# Patient Record
Sex: MALE | Race: BLACK OR AFRICAN AMERICAN | NOT HISPANIC OR LATINO
[De-identification: names, ages, dates, MRNs, and addresses within clinical notes are randomized per-mention and may not be internally consistent; named-entity substitution may affect disease eponyms.]

---

## 2018-02-09 PROBLEM — Z00.00 ENCOUNTER FOR PREVENTIVE HEALTH EXAMINATION: Status: ACTIVE | Noted: 2018-02-09

## 2018-02-21 ENCOUNTER — FORM ENCOUNTER (OUTPATIENT)
Age: 63
End: 2018-02-21

## 2018-02-22 ENCOUNTER — APPOINTMENT (OUTPATIENT)
Dept: ORTHOPEDIC SURGERY | Facility: CLINIC | Age: 63
End: 2018-02-22
Payer: COMMERCIAL

## 2018-02-22 ENCOUNTER — OUTPATIENT (OUTPATIENT)
Dept: OUTPATIENT SERVICES | Facility: HOSPITAL | Age: 63
LOS: 1 days | End: 2018-02-22
Payer: COMMERCIAL

## 2018-02-22 VITALS
WEIGHT: 250 LBS | BODY MASS INDEX: 30.44 KG/M2 | HEIGHT: 76 IN | DIASTOLIC BLOOD PRESSURE: 80 MMHG | SYSTOLIC BLOOD PRESSURE: 140 MMHG

## 2018-02-22 DIAGNOSIS — Z87.891 PERSONAL HISTORY OF NICOTINE DEPENDENCE: ICD-10-CM

## 2018-02-22 DIAGNOSIS — Z86.39 PERSONAL HISTORY OF OTHER ENDOCRINE, NUTRITIONAL AND METABOLIC DISEASE: ICD-10-CM

## 2018-02-22 DIAGNOSIS — Z82.49 FAMILY HISTORY OF ISCHEMIC HEART DISEASE AND OTHER DISEASES OF THE CIRCULATORY SYSTEM: ICD-10-CM

## 2018-02-22 DIAGNOSIS — Z86.69 PERSONAL HISTORY OF OTHER DISEASES OF THE NERVOUS SYSTEM AND SENSE ORGANS: ICD-10-CM

## 2018-02-22 DIAGNOSIS — Z86.79 PERSONAL HISTORY OF OTHER DISEASES OF THE CIRCULATORY SYSTEM: ICD-10-CM

## 2018-02-22 DIAGNOSIS — Z85.038 PERSONAL HISTORY OF OTHER MALIGNANT NEOPLASM OF LARGE INTESTINE: ICD-10-CM

## 2018-02-22 PROCEDURE — 99203 OFFICE O/P NEW LOW 30 MIN: CPT

## 2018-02-22 PROCEDURE — 72170 X-RAY EXAM OF PELVIS: CPT | Mod: 26

## 2018-02-22 PROCEDURE — 72170 X-RAY EXAM OF PELVIS: CPT

## 2018-02-22 PROCEDURE — 73564 X-RAY EXAM KNEE 4 OR MORE: CPT

## 2018-02-22 PROCEDURE — 73564 X-RAY EXAM KNEE 4 OR MORE: CPT | Mod: 26,50

## 2018-02-22 RX ORDER — COLCHICINE 0.6 MG/1
0.6 TABLET ORAL
Refills: 0 | Status: ACTIVE | COMMUNITY

## 2018-02-22 RX ORDER — LISINOPRIL 30 MG/1
TABLET ORAL
Refills: 0 | Status: ACTIVE | COMMUNITY

## 2018-02-22 RX ORDER — INDOMETHACIN 50 MG/1
CAPSULE ORAL
Refills: 0 | Status: ACTIVE | COMMUNITY

## 2018-02-22 RX ORDER — LABETALOL HYDROCHLORIDE 300 MG/1
TABLET, FILM COATED ORAL
Refills: 0 | Status: ACTIVE | COMMUNITY

## 2018-02-22 RX ORDER — FEXOFENADINE HYDROCHLORIDE 180 MG/1
TABLET ORAL
Refills: 0 | Status: ACTIVE | COMMUNITY

## 2018-02-22 RX ORDER — FLUTICASONE PROPIONATE 50 MCG
SPRAY, SUSPENSION NASAL
Refills: 0 | Status: ACTIVE | COMMUNITY

## 2018-02-22 RX ORDER — METFORMIN HYDROCHLORIDE 625 MG/1
TABLET ORAL
Refills: 0 | Status: ACTIVE | COMMUNITY

## 2019-11-05 ENCOUNTER — APPOINTMENT (OUTPATIENT)
Dept: ORTHOPEDIC SURGERY | Facility: CLINIC | Age: 64
End: 2019-11-05
Payer: COMMERCIAL

## 2019-11-05 VITALS — WEIGHT: 245 LBS | BODY MASS INDEX: 29.83 KG/M2 | HEIGHT: 76 IN

## 2019-11-05 DIAGNOSIS — M17.12 UNILATERAL PRIMARY OSTEOARTHRITIS, LEFT KNEE: ICD-10-CM

## 2019-11-05 DIAGNOSIS — M17.11 UNILATERAL PRIMARY OSTEOARTHRITIS, RIGHT KNEE: ICD-10-CM

## 2019-11-05 PROCEDURE — 99214 OFFICE O/P EST MOD 30 MIN: CPT

## 2019-11-05 NOTE — DISCUSSION/SUMMARY
[de-identified] : Mr. GRIFFITH has progressively severe knee pain and disability secondary to DJD and has failed extensive conservative care including activity modification, analgesic medications and therapeutic exercise. The patient was indicated for two stage bilateral total knee replacement, left before right.\par \par We discussed the details of the procedure, the expected recovery period, and the expected outcome. We discussed the likelihood of satisfaction after complete recovery, and the potential causes of dissatisfaction. The importance of active patient participation in the rehabilitation protocol was emphasized, along with its influence on short and long-term outcomes. Specific risks of total knee replacement were discussed in detail. We discussed the risk of surgical site complications including but not limited to: surgical site infection, wound healing complications, bone fracture, tendon or ligament injury, neurovascular injury, hemorrhage, postoperative stiffness or instability, persistent pain and need for reoperation or manipulation under anesthesia. We discussed surgical blood loss and the possible need for blood transfusion. We discussed the risk of perioperative medical complications, including but not limited to catheter-associated urinary tract infection, venous thromboembolism and other cardiopulmonary complications. We discussed anesthetic options and the risk of anesthesia-related complications. We discussed the durability of prosthetic knees and limitations related to wear, osteolysis and loosening. The patient was given a copy of my preoperative packet with additional information about the procedure.\par \par I emphasized the importance of doing the proper physical therapy in the time between the first and second operations so that his arthritic right knee will not limit the operative recovery of his left knee. We also discussed the types of anesthesia used in the operation and the ways in which we work to make them as safe as possible.\par \par The patient gave informed consent for the surgical procedure and was instructed to speak to my surgical coordinator to arrange the logistics of surgical scheduling, presurgical testing, and medical optimization and clearance.

## 2019-11-05 NOTE — HISTORY OF PRESENT ILLNESS
[de-identified] : 63 y/o M presents today for follow up evaluation and surgical consult of bilateral knee pain, left worse than right. He was last seen in February of 2018. Patient reports that since then he's lost weight and has gotten his DM under control. Today he reports severe left knee pain and moderate, continual right knee pain. He reports that both knees are moderately stiff and unstable on stairs. Patient can walk greater than 10 blocks with a moderate limp, bilateral knee braces, and a cane for long distances. He uses a rail to ascend and descend stairs. \par Patient has a h/o colon cancer that was treated with surgery. He states that he is nervous about anesthesia during the operation because after a previous operation he fell asleep on the button controlling his medication, overdosed, and needed Narcan to recover.\par Of note, patient works in  and reports that his work is very physical.

## 2019-11-05 NOTE — PHYSICAL EXAM
[de-identified] : Well appearing. NAD. Alert and oriented x 3. No respiratory distress.\par Bilateral upper extremities: Skin intact. No deformity. Painless active ROM without evident restriction.\par Cervical, thoracic and lumbar spine: No visible deformity. Painless active ROM without evident restriction. No radicular pain on passive straight leg raise bilaterally.\par \par Gait: Antalgic.\par Ambulatory assist devices: Cane. Bilateral knee braces.\par \par Bilateral Hips: No swelling or deformity. Painless and unrestricted range of motion. No crepitation. \par \par Right Knee:\par Skin intact. No surgical scars. No erythema or ecchymosis. No swelling or effusion. No deformity. (+) Lateral joint line tenderness.\par Painless ROM from a 10 degree flexion contracture to 105 degrees of flexion.\par Central patellar tracking. (+) crepitation. No instability.\par \par Left Knee:\par Skin intact. No surgical scars. No erythema or ecchymosis. No swelling or effusion. (+) Valgus deformity. (+) Lateral joint line tenderness. (+) Mild pain with patellar grind.\par Painful ROM from a 5 degree flexion contracture to 110 degrees of flexion.\par Central patellar tracking. (+) Significant crepitation. No instability.\par \par Neurological: Intact distal crude touch sensation. Normal distal motor power. \par Cardiovascular: Lower extremities warm and well perfused. No peripheral edema. [de-identified] : No new imaging was reviewed at this time.\par

## 2019-11-05 NOTE — END OF VISIT
[FreeTextEntry3] : All medical record entries made by Abad Odom acting as a scribe for the performing provider (Terrence Mac MD and/or RUPERT Martinez) on 11/05/2019. All entries were dictated to me by the performing medical provider. In signing this record, the medical provider affirms that they have personally performed the history, physical exam, assessment and plan and have also directed, reviewed and agreed to the documentation in the chart.

## 2020-06-18 ENCOUNTER — APPOINTMENT (OUTPATIENT)
Dept: PULMONOLOGY | Facility: CLINIC | Age: 65
End: 2020-06-18

## 2020-07-14 ENCOUNTER — APPOINTMENT (OUTPATIENT)
Dept: PULMONOLOGY | Facility: CLINIC | Age: 65
End: 2020-07-14
Payer: COMMERCIAL

## 2020-07-14 VITALS
HEIGHT: 76 IN | WEIGHT: 235 LBS | SYSTOLIC BLOOD PRESSURE: 170 MMHG | BODY MASS INDEX: 28.62 KG/M2 | HEART RATE: 66 BPM | DIASTOLIC BLOOD PRESSURE: 91 MMHG | OXYGEN SATURATION: 95 % | TEMPERATURE: 99 F

## 2020-07-14 PROCEDURE — 99203 OFFICE O/P NEW LOW 30 MIN: CPT

## 2020-07-15 NOTE — ASSESSMENT
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 Referred by Dr. Melo Reynolds for SAMIRA evaluation.\par \par 1) SAMIRA - He likely still has SAMIRA given his sleep-related symptoms. Will do a HST to further evaluate. He was referred to the [facility] for a diagnostic HST. Counselled about weight loss as well. The ramifications of SAMIRA and its treatment were discussed in detail. Follow up after the HST.

## 2020-07-15 NOTE — REVIEW OF SYSTEMS
[EDS: ESS=____] : daytime somnolence: ESS=[unfilled] [Arthralgias] : arthralgias [Lower Extremity Discomfort] : no lower extremity discomfort [Late day/ Evening symptoms] : no late day/evening symptoms [Cataplexy] :  no cataplexy [Unusual Sleep Behavior] : no unusual sleep behavior [Hypersomnolence] : not sleeping much more than usual [Negative] : Psychiatric

## 2020-07-15 NOTE — CONSULT LETTER
[Dear  ___] : Dear  [unfilled], [Please see my note below.] : Please see my note below. [Consult Letter:] : I had the pleasure of evaluating your patient, [unfilled]. [Consult Closing:] : Thank you very much for allowing me to participate in the care of this patient.  If you have any questions, please do not hesitate to contact me. [Sincerely,] : Sincerely, [FreeTextEntry3] : Brooke Cortez MD\par \par [place] [facility] [employer] [facility]\par Pulmonary, Critical Care, and Sleep Medicine\par

## 2020-07-15 NOTE — PHYSICAL EXAM
[Normal Appearance] : normal appearance [General Appearance - Well Developed] : well developed [No Deformities] : no deformities [Well Groomed] : well groomed [General Appearance - Well Nourished] : well nourished [General Appearance - In No Acute Distress] : no acute distress [Eyelids - No Xanthelasma] : the eyelids demonstrated no xanthelasmas [Normal Conjunctiva] : the conjunctiva exhibited no abnormalities [Normal Oropharynx] : normal oropharynx [Neck Appearance] : the appearance of the neck was normal [Neck Cervical Mass (___cm)] : no neck mass was observed [Jugular Venous Distention Increased] : there was no jugular-venous distention [Thyroid Diffuse Enlargement] : the thyroid was not enlarged [Thyroid Nodule] : there were no palpable thyroid nodules [Heart Rate And Rhythm] : heart rate was normal and rhythm regular [Heart Sounds] : normal S1 and S2 [Heart Sounds Gallop] : no gallops [Murmurs] : no murmurs [Heart Sounds Pericardial Friction Rub] : no pericardial rub [Auscultation Breath Sounds / Voice Sounds] : lungs were clear to auscultation bilaterally [Nail Clubbing] : no clubbing of the fingernails [Cyanosis, Localized] : no localized cyanosis [Petechial Hemorrhages (___cm)] : no petechial hemorrhages [Skin Turgor] : normal skin turgor [Skin Color & Pigmentation] : normal skin color and pigmentation [] : no rash [Oriented To Time, Place, And Person] : oriented to person, place, and time [Impaired Insight] : insight and judgment were intact [Affect] : the affect was normal [No Focal Deficits] : no focal deficits [FreeTextEntry1] : uses cane

## 2020-07-15 NOTE — HISTORY OF PRESENT ILLNESS
[Obstructive Sleep Apnea] : obstructive sleep apnea [Snoring] : snoring [Witnessed Apneas] : witnessed sleep apnea [Frequent Nocturnal Awakening] : frequent nocturnal awakening [Daytime Somnolence] : daytime somnolence [ESS: ___] : ESS score [unfilled] [Unintentional Sleep while Active] : unintentional sleep while active [Unintentional Sleep While Inactive] : unintentional sleep while inactive [Awakes Unrefreshed] : awakening unrefreshed [FreeTextEntry1] : 64 yo M with prior history of SAMIRA diagnosed 10-15 years ago, presents to us for re-evaluation. He had a sleep study done at that time at [facility] and received a CPAP machined then. He was compliant with the machine up until about 7 years ago, but misplaced it during a move, and has not been using it since then. He typically has frequent night time awakenings, snores very loudly, and does not feel refreshed after sleep. He has daytime sleepiness as well, and all of these symptoms have become gradually worse over the past couple of years, which prompted him to visit us today.\par \par ROS is negative aside from sleep symptoms and chronic knee pain.  [Awakes with Headache] : no headache upon awakening [Recent  Weight Gain] : no recent weight gain [Awakening With Dry Mouth] : no dry mouth upon awakening [Hypersomnolence] : no hypersomnolence [Unusual Sleep Behavior] : no unusual sleep behavior [Cataplexy] : no cataplexy [Sleep Paralysis] : no sleep paralysis [Hypnagogic Hallucinations] : no hallucinations when falling asleep [Lower Extremity Discomfort] : no lower extremity discomfort in evening or at bedtime [Hypnopompic Hallucinations] : no hallucinations when awakening

## 2022-06-02 ENCOUNTER — APPOINTMENT (OUTPATIENT)
Dept: ORTHOPEDIC SURGERY | Facility: CLINIC | Age: 67
End: 2022-06-02

## 2022-06-02 DIAGNOSIS — M75.102 UNSPECIFIED ROTATOR CUFF TEAR OR RUPTURE OF LEFT SHOULDER, NOT SPECIFIED AS TRAUMATIC: ICD-10-CM

## 2022-06-02 DIAGNOSIS — M12.812 OTHER SPECIFIC ARTHROPATHIES, NOT ELSEWHERE CLASSIFIED, LEFT SHOULDER: ICD-10-CM

## 2022-06-02 PROCEDURE — 73030 X-RAY EXAM OF SHOULDER: CPT | Mod: LT

## 2022-06-02 PROCEDURE — 76882 US LMTD JT/FCL EVL NVASC XTR: CPT | Mod: 59,LT

## 2022-06-02 PROCEDURE — 20611 DRAIN/INJ JOINT/BURSA W/US: CPT | Mod: LT

## 2022-06-02 PROCEDURE — 99204 OFFICE O/P NEW MOD 45 MIN: CPT | Mod: 25

## 2022-06-02 NOTE — HISTORY OF PRESENT ILLNESS
[de-identified] : LEFT SHOULDER PAIN \par PAIN STARTED MARCH 1, 2022- MIGHT BE A FLARE UP FROM AN OLD INJURY FROM YEARS AGO 2018\par CONSTANT \par RADIATES DOWN ARM \par PAIN LEVEL: 6/10\par SHARP, ACHY \par LIMITED ROM \par BETTER WITH REST \par WORSE WHEN OVER HEAD LIFTING, REACHING BEHIND, AT NIGHT \par PT SAW DR. ADEEL FATIMA- APRIL 29, 2022- DIAGNOSED WITH FROZEN SHOULDER \par PT IS SCHEDULED TO HAVE KNEE SURGERY IN JULY

## 2022-06-02 NOTE — PROCEDURE
[de-identified] : DIAGNOSTIC ULTRASOUND LEFT SHOULDER\par \par DIAGNOSTIC SONOGRAPHY of the Rotator Cuff Soft Tissue of the LEFT  SHOULDER was performed in Multiple Scan Planes with varying transducer frequencies.\par Imaging of the Supraspinatus Tendon reveals COMPLETE RETRACTED TEAR\par Imaging of the Biceps Tendon reveals no significant tear.\par Imaging of the Subscapularis Tendon reveals no significant tear.\par Imaging of the Infraspinatus Tendon reveals COMPLETE TEAR \par Key images were save digitally and reviewed with patient.\par

## 2022-06-02 NOTE — DISCUSSION/SUMMARY
[de-identified] : \par PATIENT HAS ELECTED TO PROCEED WITH KENALOG INJECTION SHOULDER \par RISKS AND BENEFITS DISCUSSED - VERBAL CONSENT OBTAINED \par SEE PROCEDURE NOTE\par \par \par POST INJECTION INSTRUCTIONS:\par \par INJECTION THERAPY HANDOUT PROVIDED\par \par COLD THERAPY , ANALGESICS PRN\par \par HOME  EXERCISES QD - PENDULUM AND ROM  HANDOUT PROVIDED, REVIEWED AND DEMONSTRATED - REFERRED TO INSTRUCTIONAL VIDEO ON MY WEBSITE\par \par START P.T.  WITHIN 2 WEEKS AFTER INJECTION - 2 X 4 WEEKS \par \par MRI IF NO RELIEF 12 WEEKS\par

## 2022-06-02 NOTE — PHYSICAL EXAM
[de-identified] : PHYSICAL EXAM LEFT  SHOULDER\par \par MARKED  SCAPULAR PROTRACTION\par AROM 135 / 135 / 55 / 0 \par TENDER: SA REGION ANTERIOR AND LATERAL\par \par SPECIAL TESTING :\par JOSE - POSITIVE \par YASSINE - POSITIVE \par SPEED TEST - POSITIVE\par \par MOMIN - NEGATIVE \par APPREHENSION AND SUPPRESSION - NEGATIVE \par \par RC STRENGTH TESTING \par SS:  5/5\par SUB 5/5\par IS     5/5\par BICEPS  5/5\par \par SENSATION  - GROSSLY INTACT\par \par \par  [de-identified] : LEFT SHOULDER XRAY (2 VIEWS - AP AND OUTLET) -  \par NO OBVIOUS FRACTURE , SEPARATION OR DISLOCATION \par SA SPACE 4MM\par GRADE 1  OSTEOARTHRITIS, \par TYPE 3B ACROMION + LATERAL SPUR\par CSA=39.1\par

## 2022-06-21 ENCOUNTER — NON-APPOINTMENT (OUTPATIENT)
Age: 67
End: 2022-06-21

## 2022-06-21 ENCOUNTER — OUTPATIENT (OUTPATIENT)
Dept: OUTPATIENT SERVICES | Facility: HOSPITAL | Age: 67
LOS: 1 days | End: 2022-06-21
Payer: COMMERCIAL

## 2022-06-21 ENCOUNTER — APPOINTMENT (OUTPATIENT)
Dept: PULMONOLOGY | Facility: CLINIC | Age: 67
End: 2022-06-21
Payer: COMMERCIAL

## 2022-06-21 VITALS
BODY MASS INDEX: 29.71 KG/M2 | HEIGHT: 76 IN | SYSTOLIC BLOOD PRESSURE: 163 MMHG | RESPIRATION RATE: 12 BRPM | HEART RATE: 78 BPM | WEIGHT: 244 LBS | OXYGEN SATURATION: 98 % | TEMPERATURE: 97.3 F | DIASTOLIC BLOOD PRESSURE: 97 MMHG

## 2022-06-21 PROCEDURE — 99214 OFFICE O/P EST MOD 30 MIN: CPT

## 2022-06-21 PROCEDURE — 71046 X-RAY EXAM CHEST 2 VIEWS: CPT

## 2022-06-21 PROCEDURE — 71046 X-RAY EXAM CHEST 2 VIEWS: CPT | Mod: 26

## 2022-06-21 NOTE — PHYSICAL EXAM
[No Acute Distress] : no acute distress [Normal Oropharynx] : normal oropharynx [Normal Appearance] : normal appearance [Normal Rate/Rhythm] : normal rate/rhythm [Normal S1, S2] : normal s1, s2 [No Resp Distress] : no resp distress [Clear to Auscultation Bilaterally] : clear to auscultation bilaterally [Normal Gait] : normal gait [No Clubbing] : no clubbing [Normal Color/ Pigmentation] : normal color/ pigmentation [Oriented x3] : oriented x3 [Normal Affect] : normal affect

## 2022-06-21 NOTE — HISTORY OF PRESENT ILLNESS
[Former] : former [Never] : never [TextBox_4] : 64 yo M with prior history of SAMIRA diagnosed 10-15 years ago, presents to us for re-evaluation. He had a sleep study done at that time at [facility] and received a CPAP machined then. He was compliant with the machine up until about 7 years ago, but misplaced it during a move, and has not been using it since then. He typically has frequent night time awakenings, snores very loudly, and does not feel refreshed after sleep. He has daytime sleepiness as well, and all of these symptoms have become gradually worse over the past couple of years, which prompted him to visit us today.\par \par 6/21/2022\par Pending left knee replacement on July 12, 2022. Was hospitalized at [facility] from Dec 2020 to Jan 2021 due to COVID. Denies SOB. Doing well except for knee pain. Was supposed to get a CPAP machine from the hospital but it never got done. No prior issues with anesthesia. \par \par PCP Melo Reynolds\par [facility]\par  [ESS] : 2

## 2022-06-21 NOTE — ASSESSMENT
[FreeTextEntry1] : 66 yo M with HTN, DM, prior dx of SAMIRA currently not on CPAP. Referred by Dr. Reynolds. Pending left knee replacement. Indicated for HST, CXR, and PFTs for pre operative assessment. Referred to the [facility] for an HST. Follow up after testing is resulted.

## 2022-06-21 NOTE — CONSULT LETTER
[Dear  ___] : Dear  [unfilled], [Consult Letter:] : I had the pleasure of evaluating your patient, [unfilled]. [Please see my note below.] : Please see my note below. [Consult Closing:] : Thank you very much for allowing me to participate in the care of this patient.  If you have any questions, please do not hesitate to contact me. [Sincerely,] : Sincerely, [FreeTextEntry3] : Brooke Cortez MD\par \par [place] [facility] [employer] at [facility]\par Pulmonary, Critical Care, and Sleep Medicine\par  [DrConcepcion  ___] : Dr. OLMSTEAD

## 2022-06-27 ENCOUNTER — LABORATORY RESULT (OUTPATIENT)
Age: 67
End: 2022-06-27

## 2022-06-27 ENCOUNTER — TRANSCRIPTION ENCOUNTER (OUTPATIENT)
Age: 67
End: 2022-06-27

## 2022-06-28 ENCOUNTER — OUTPATIENT (OUTPATIENT)
Dept: OUTPATIENT SERVICES | Facility: HOSPITAL | Age: 67
LOS: 1 days | End: 2022-06-28
Payer: COMMERCIAL

## 2022-06-28 ENCOUNTER — APPOINTMENT (OUTPATIENT)
Dept: SLEEP CENTER | Facility: HOME HEALTH | Age: 67
End: 2022-06-28

## 2022-06-28 ENCOUNTER — NON-APPOINTMENT (OUTPATIENT)
Age: 67
End: 2022-06-28

## 2022-06-28 PROCEDURE — 95800 SLP STDY UNATTENDED: CPT

## 2022-06-28 PROCEDURE — 95800 SLP STDY UNATTENDED: CPT | Mod: 26

## 2022-06-29 DIAGNOSIS — G47.33 OBSTRUCTIVE SLEEP APNEA (ADULT) (PEDIATRIC): ICD-10-CM

## 2022-06-30 ENCOUNTER — APPOINTMENT (OUTPATIENT)
Dept: PULMONOLOGY | Facility: CLINIC | Age: 67
End: 2022-06-30

## 2022-06-30 PROCEDURE — 94729 DIFFUSING CAPACITY: CPT

## 2022-06-30 PROCEDURE — 94010 BREATHING CAPACITY TEST: CPT

## 2022-06-30 PROCEDURE — 94727 GAS DIL/WSHOT DETER LNG VOL: CPT

## 2022-07-01 ENCOUNTER — NON-APPOINTMENT (OUTPATIENT)
Age: 67
End: 2022-07-01

## 2022-07-05 ENCOUNTER — APPOINTMENT (OUTPATIENT)
Dept: PULMONOLOGY | Facility: CLINIC | Age: 67
End: 2022-07-05

## 2022-07-08 ENCOUNTER — APPOINTMENT (OUTPATIENT)
Dept: PULMONOLOGY | Facility: CLINIC | Age: 67
End: 2022-07-08

## 2022-07-08 DIAGNOSIS — G47.33 OBSTRUCTIVE SLEEP APNEA (ADULT) (PEDIATRIC): ICD-10-CM

## 2022-07-08 PROCEDURE — 99443: CPT

## 2022-07-08 NOTE — ASSESSMENT
[FreeTextEntry1] : REVIEWED\par 1. PFTs 6/30/2022: FEV1 84, FEV1/, TLC 60, DLCO 60, DLCO/\par 2. CXR 6/21/2022: no pleural/parenchymal abnormalities\par 3. HST 6/2022: AHI 67 4%; T88 10.5\par \par \par 66 yo M with HTN, DM, prior dx of SAMIRA currently not on CPAP. Referred by Dr. Reynolds. Pending left knee replacement. CXR without pathology. PFTs with restrictive lung disease most likely secondary to weight. HST c/w severe SAMIRA.  Indicated for treatment. First line treatment option is PAP therapy. Will start with APAP, set 5 to 20 cm H2O with a home mask fitting. DME is [facility]. The benefits of SAMIRA treatment in improving cardiac, neurologic, cognitive, and mortality outcomes were discussed. Adherence goal is at least 4 hours per night on 70% of nights with an AHI of less than 10 events per hour. Advised on equipment backorders.\par \par Preoperative risk assessment: SAMIRA increases post surgical cardiopulmonary risk irrespective of treatment based on current evidence. While it is ideal to treat SAMIRA pre operatively there is not definitive literature to support that doing so will decrease the cardiopulmonary risks associated with the disease postoperatively. Surgery can proceed without SAMIRA treatment but if SAMIRA treatment is started before surgery then PAP should be used post operatively as well. The anesthesia team should follow standard ASA SAMIRA precautions including extubation when fully awake and upright, judicious use of opiates for pain control, as well as prolonged monitoring. No further optimization is required prior to proceeding with surgery.\par \par Follow up within 6 weeks of starting PAP use whether it is before or after knee replacement.\par

## 2022-07-08 NOTE — CONSULT LETTER
[Dear  ___] : Dear  [unfilled], [Courtesy Letter:] : I had the pleasure of seeing your patient, [unfilled], in my office today. [Please see my note below.] : Please see my note below. [Consult Closing:] : Thank you very much for allowing me to participate in the care of this patient.  If you have any questions, please do not hesitate to contact me. [Sincerely,] : Sincerely, [DrConcepcion  ___] : Dr. OLMSTEAD [FreeTextEntry3] : Brooke Cortez MD\par \par [place] [facility] [employer] at [facility]\par Pulmonary, Critical Care, and Sleep Medicine\par

## 2022-07-08 NOTE — HISTORY OF PRESENT ILLNESS
[TextBox_4] : 66 yo M with prior history of SAMIRA diagnosed 10-15 years ago, presents to us for re-evaluation. He had a sleep study done at that time at [facility] and received a CPAP machined then. He was compliant with the machine up until about 7 years ago, but misplaced it during a move, and has not been using it since then. He typically has frequent night time awakenings, snores very loudly, and does not feel refreshed after sleep. He has daytime sleepiness as well, and all of these symptoms have become gradually worse over the past couple of years, which prompted him to visit us today.\par \par 6/21/2022\par Pending left knee replacement on July 12, 2022. Was hospitalized at [facility] from Dec 2020 to Jan 2021 due to COVID. Denies SOB. Doing well except for knee pain. Was supposed to get a CPAP machine from the hospital but it never got done. No prior issues with anesthesia. \par \par PCP Melo Reynolds\par [facility]\par \par 7/8/2022\par Had testing done and would like to discuss results.  [ESS] : 2

## 2022-07-11 ENCOUNTER — APPOINTMENT (OUTPATIENT)
Dept: PULMONOLOGY | Facility: CLINIC | Age: 67
End: 2022-07-11

## 2023-05-22 ENCOUNTER — APPOINTMENT (OUTPATIENT)
Dept: PULMONOLOGY | Facility: CLINIC | Age: 68
End: 2023-05-22
Payer: MEDICARE

## 2023-05-22 VITALS
WEIGHT: 239 LBS | BODY MASS INDEX: 29.1 KG/M2 | HEART RATE: 64 BPM | OXYGEN SATURATION: 99 % | DIASTOLIC BLOOD PRESSURE: 87 MMHG | HEIGHT: 76 IN | TEMPERATURE: 97.9 F | SYSTOLIC BLOOD PRESSURE: 144 MMHG

## 2023-05-22 DIAGNOSIS — Z01.818 ENCOUNTER FOR OTHER PREPROCEDURAL EXAMINATION: ICD-10-CM

## 2023-05-22 DIAGNOSIS — G47.33 OBSTRUCTIVE SLEEP APNEA (ADULT) (PEDIATRIC): ICD-10-CM

## 2023-05-22 PROCEDURE — 99213 OFFICE O/P EST LOW 20 MIN: CPT

## 2023-05-22 RX ORDER — ATORVASTATIN CALCIUM 10 MG/1
10 TABLET, FILM COATED ORAL
Refills: 0 | Status: ACTIVE | COMMUNITY
Start: 2023-05-22

## 2023-05-22 NOTE — PHYSICAL EXAM
[No Acute Distress] : no acute distress [Normal Oropharynx] : normal oropharynx [Normal Appearance] : normal appearance [Normal Rate/Rhythm] : normal rate/rhythm [Normal S1, S2] : normal s1, s2 [No Murmurs] : no murmurs [No Resp Distress] : no resp distress [Oriented x3] : oriented x3 [Normal Affect] : normal affect

## 2023-05-23 NOTE — CONSULT LETTER
[Dear  ___] : Dear  [unfilled], [Courtesy Letter:] : I had the pleasure of seeing your patient, [unfilled], in my office today. [Please see my note below.] : Please see my note below. [Consult Closing:] : Thank you very much for allowing me to participate in the care of this patient.  If you have any questions, please do not hesitate to contact me. [Sincerely,] : Sincerely, [DrConcepcion  ___] : Dr. OLMSTEAD [FreeTextEntry3] : Brooke Cortez MD\par \par [place] [facility] [employer] at [facility]\par Pulmonary, Critical Care, and Sleep Medicine\par

## 2023-05-23 NOTE — HISTORY OF PRESENT ILLNESS
[Former] : former [Never] : never [TextBox_4] : 64 yo M with prior history of SAMIRA diagnosed 10-15 years ago, presents to us for re-evaluation. He had a sleep study done at that time at [facility] and received a CPAP machined then. He was compliant with the machine up until about 7 years ago, but misplaced it during a move, and has not been using it since then. He typically has frequent night time awakenings, snores very loudly, and does not feel refreshed after sleep. He has daytime sleepiness as well, and all of these symptoms have become gradually worse over the past couple of years, which prompted him to visit us today.\par \par 6/21/2022\par Pending left knee replacement on July 12, 2022. Was hospitalized at [facility] from Dec 2020 to Jan 2021 due to COVID. Denies SOB. Doing well except for knee pain. Was supposed to get a CPAP machine from the hospital but it never got done. No prior issues with anesthesia. \par \par PCP Melo Reynolds\par [facility]\par \par 7/8/2022\par Had testing done and would like to discuss results. \par \par 5/22/2023\par Was not able to get knee replacement last year. Using PAP and is perceiving benefit.\par \par DME [facility]\par Machine\par Mask [ESS] : 4

## 2023-05-23 NOTE — ASSESSMENT
[FreeTextEntry1] : REVIEWED\par 1. PFTs 6/30/2022: FEV1 84, FEV1/, TLC 60, DLCO 60, DLCO/\par 2. CXR 6/21/2022: no pleural/parenchymal abnormalities\par 3. HST 6/2022: AHI 67 4%; T88 10.5\par 4. AirView data not available; did not bring his SD card\par \par \par 64 yo M with HTN, DM, with severe SAMIRA. Pending left knee replacement. CXR from 2022 without pathology. PFTs from 2022 with restrictive lung disease most likely secondary to weight. Has been using PAP and is perceiving benefit. Efficacy and adherence are at goal as per patient data review but self reported since he has a C2C machine with no modem.\par \par Preoperative risk assessment: SAMIRA increases post surgical cardiopulmonary risk irrespective of treatment based on current evidence. PAP should be used pre and postoperatively. Endotracheal control of the airway with general anesthesia is the safest option for the patient given SAMIRA severity. The anesthesia team should follow standard ASA SAMIRA precautions including extubation when fully awake and upright, judicious use of opiates for pain control, as well as prolonged monitoring. No further optimization is required prior to proceeding with surgery.\par \par Follow up within 2 months post operatively.\par

## 2024-03-11 ENCOUNTER — APPOINTMENT (OUTPATIENT)
Dept: DERMATOLOGY | Facility: CLINIC | Age: 69
End: 2024-03-11

## 2024-05-28 ENCOUNTER — APPOINTMENT (OUTPATIENT)
Dept: UROLOGY | Facility: CLINIC | Age: 69
End: 2024-05-28
Payer: MEDICARE

## 2024-05-28 VITALS
DIASTOLIC BLOOD PRESSURE: 80 MMHG | BODY MASS INDEX: 29.1 KG/M2 | SYSTOLIC BLOOD PRESSURE: 126 MMHG | HEART RATE: 61 BPM | TEMPERATURE: 97.6 F | WEIGHT: 239 LBS | HEIGHT: 76 IN | OXYGEN SATURATION: 99 %

## 2024-05-28 DIAGNOSIS — N40.0 BENIGN PROSTATIC HYPERPLASIA WITHOUT LOWER URINARY TRACT SYMPMS: ICD-10-CM

## 2024-05-28 PROCEDURE — 99204 OFFICE O/P NEW MOD 45 MIN: CPT

## 2024-05-29 LAB
PSA FREE FLD-MCNC: 26 %
PSA FREE SERPL-MCNC: 0.6 NG/ML
PSA SERPL-MCNC: 2.31 NG/ML

## 2024-05-29 NOTE — LETTER BODY
[Dear  ___] : Dear  [unfilled], [Consult Letter:] : I had the pleasure of evaluating your patient, [unfilled]. [Please see my note below.] : Please see my note below. [Consult Closing:] : Thank you very much for allowing me to participate in the care of this patient.  If you have any questions, please do not hesitate to contact me. [Sincerely,] : Sincerely, [FreeTextEntry3] : Ron Burton MD System Director Urogynecology/FPS Department of Urology [facility]    at [facility]  of Urology [facility] [employer] at [facility]

## 2024-05-29 NOTE — ASSESSMENT
[FreeTextEntry1] :   Impression/plan: 60-year-old gentleman with incidental finding of potential bladder lesion on a CAT scan.  1.  Urine culture activity and urine cytology. 2.  Cystoscopy. 3.  PSA drawn today for prostate cancer screening, rectal exam will be performed at time of cystoscopy.

## 2024-05-29 NOTE — HISTORY OF PRESENT ILLNESS
[FreeTextEntry1] : 67 yo gentleman referred today for incidental finding of a possible bladder lesion near the bladder neck/prostatic urethral area on a CT scan.  He has a past surgical history of knee replacement, colectomy, and hernia.  He denies any irritative or obstructive voiding symptoms.  He denies gross hematuria.  He denies dysuria.  He denies any recent urinary tract infections or STIs.  The patient denies any smoking history or exposure to toxic chemicals.  Patient states he does get present cancer screening, cannot recall his PSAs in the past.  He is in between internists at this point.   PVR - 0 ml.

## 2024-06-03 LAB — URINE CYTOLOGY: NORMAL

## 2024-06-07 ENCOUNTER — APPOINTMENT (OUTPATIENT)
Dept: UROLOGY | Facility: CLINIC | Age: 69
End: 2024-06-07
Payer: MEDICARE

## 2024-06-07 VITALS
OXYGEN SATURATION: 99 % | SYSTOLIC BLOOD PRESSURE: 98 MMHG | DIASTOLIC BLOOD PRESSURE: 66 MMHG | HEART RATE: 80 BPM | TEMPERATURE: 97.8 F

## 2024-06-07 VITALS
TEMPERATURE: 98.5 F | SYSTOLIC BLOOD PRESSURE: 166 MMHG | HEART RATE: 61 BPM | DIASTOLIC BLOOD PRESSURE: 95 MMHG | OXYGEN SATURATION: 100 %

## 2024-06-07 DIAGNOSIS — N40.0 BENIGN PROSTATIC HYPERPLASIA WITHOUT LOWER URINARY TRACT SYMPMS: ICD-10-CM

## 2024-06-07 DIAGNOSIS — N32.9 BLADDER DISORDER, UNSPECIFIED: ICD-10-CM

## 2024-06-07 PROCEDURE — 52000 CYSTOURETHROSCOPY: CPT

## 2024-06-07 PROCEDURE — 99213 OFFICE O/P EST LOW 20 MIN: CPT | Mod: 25

## 2024-06-11 LAB
BILIRUB UR QL STRIP: NORMAL
CLARITY UR: CLEAR
COLLECTION METHOD: NORMAL
GLUCOSE UR-MCNC: NORMAL
HCG UR QL: 0.2 EU/DL
HGB UR QL STRIP.AUTO: NORMAL
KETONES UR-MCNC: NORMAL
LEUKOCYTE ESTERASE UR QL STRIP: NORMAL
NITRITE UR QL STRIP: NORMAL
PH UR STRIP: 6
PROT UR STRIP-MCNC: NORMAL
SP GR UR STRIP: >=1.03

## 2024-06-12 PROBLEM — N32.9 LESION OF BLADDER: Status: ACTIVE | Noted: 2024-05-28

## 2024-06-12 PROBLEM — N40.0 BPH WITHOUT URINARY OBSTRUCTION: Status: ACTIVE | Noted: 2024-06-12

## 2024-06-12 NOTE — HISTORY OF PRESENT ILLNESS
[FreeTextEntry1] : 67 yo gentleman referred today for incidental finding of a possible bladder lesion near the bladder neck/prostatic urethral area on a CT scan. He has a past surgical history of knee replacement, colectomy, and hernia. He denies any irritative or obstructive voiding symptoms. He denies gross hematuria. He denies dysuria. He denies any recent urinary tract infections or STIs. The patient denies any smoking history or exposure to toxic chemicals. Patient states he does get present cancer screening, cannot recall his PSAs in the past. He is in between internists at this point.  PVR - 0 ml.  6/7/24  67 yo gentleman referred today for incidental finding of a possible bladder lesion near the bladder neck/prostatic urethral area on a CT scan. The patient is here today to have cystoscopy performed, review results and discuss treatment options. Denies significant change in med/surg history since last office visit.    Cysto - small median lobe, no lesions noted.   PSA 2.31  INGRIS - 40 gms, anodular, no abnormalities.   urine cytology - negative  Plan: Negative w/u. F/u for annual prostate screening.

## 2024-06-12 NOTE — LETTER BODY
[Dear  ___] : Dear  [unfilled], [Courtesy Letter:] : I had the pleasure of seeing your patient, [unfilled], in my office today. [Please see my note below.] : Please see my note below. [Consult Closing:] : Thank you very much for allowing me to participate in the care of this patient.  If you have any questions, please do not hesitate to contact me. [Sincerely,] : Sincerely, [FreeTextEntry3] : Ron Burton MD System Director Urogynecology/[facility] Department of Urology [facility]    at [facility]  of Urology [facility]

## 2025-06-03 ENCOUNTER — NON-APPOINTMENT (OUTPATIENT)
Age: 70
End: 2025-06-03

## 2025-06-04 ENCOUNTER — NON-APPOINTMENT (OUTPATIENT)
Age: 70
End: 2025-06-04

## 2025-06-04 ENCOUNTER — APPOINTMENT (OUTPATIENT)
Dept: UROLOGY | Facility: CLINIC | Age: 70
End: 2025-06-04

## 2025-06-04 VITALS
HEART RATE: 64 BPM | SYSTOLIC BLOOD PRESSURE: 178 MMHG | OXYGEN SATURATION: 99 % | TEMPERATURE: 97.5 F | DIASTOLIC BLOOD PRESSURE: 88 MMHG

## 2025-06-04 PROCEDURE — 99213 OFFICE O/P EST LOW 20 MIN: CPT

## 2025-06-06 LAB
PSA FREE FLD-MCNC: 28 %
PSA FREE SERPL-MCNC: 0.73 NG/ML
PSA SERPL-MCNC: 2.63 NG/ML